# Patient Record
Sex: FEMALE
[De-identification: names, ages, dates, MRNs, and addresses within clinical notes are randomized per-mention and may not be internally consistent; named-entity substitution may affect disease eponyms.]

---

## 2023-12-28 ENCOUNTER — APPOINTMENT (OUTPATIENT)
Dept: ORTHOPEDIC SURGERY | Facility: CLINIC | Age: 73
End: 2023-12-28
Payer: MEDICARE

## 2023-12-28 VITALS — WEIGHT: 150 LBS | HEIGHT: 64 IN | BODY MASS INDEX: 25.61 KG/M2 | RESPIRATION RATE: 16 BRPM

## 2023-12-28 DIAGNOSIS — Z78.9 OTHER SPECIFIED HEALTH STATUS: ICD-10-CM

## 2023-12-28 DIAGNOSIS — Z87.39 PERSONAL HISTORY OF OTHER DISEASES OF THE MUSCULOSKELETAL SYSTEM AND CONNECTIVE TISSUE: ICD-10-CM

## 2023-12-28 DIAGNOSIS — S69.90XA UNSPECIFIED INJURY OF UNSPECIFIED WRIST, HAND AND FINGER(S), INITIAL ENCOUNTER: ICD-10-CM

## 2023-12-28 PROBLEM — Z00.00 ENCOUNTER FOR PREVENTIVE HEALTH EXAMINATION: Status: ACTIVE | Noted: 2023-12-28

## 2023-12-28 PROCEDURE — 99203 OFFICE O/P NEW LOW 30 MIN: CPT

## 2023-12-28 PROCEDURE — 73140 X-RAY EXAM OF FINGER(S): CPT | Mod: F9

## 2023-12-28 NOTE — HISTORY OF PRESENT ILLNESS
[Right] : right hand dominant [FreeTextEntry1] : Date of Injury: 12/26/23 (2 days ago) 73-year-old female presents for evaluation of right small finger injury after sustaining a fall 2 days ago while walking. The patient states that she did not seek medical attention and self treated at home with ice and Tylenol. The patient saw Janey for her shoulder therapy who advised the patient to see me.  She is having difficulty straightening her right small finger.

## 2023-12-28 NOTE — ASSESSMENT
[FreeTextEntry1] : My impression is the patient has a right small finger bony mallet injury.  I explained the expectations of a residual extensor lag and dorsal callus tissue prominence over the DIP joint.  I explained full-time mallet splint wear for 6 to 8 weeks to try and correct the deformity as much as possible and regain DIP extension strength with reconstitution of the terminal extensor insertion.  I did note that the splint should be worn at all times and that taking it off and flexing the DIP joint necessitates starting all over again. I directed the patient to keep an eye on for any skin irritation or maceration.  All questions were answered and he will plan to follow-up with me in 6 weeks. A prescription to obtain a custom split was given today.

## 2023-12-28 NOTE — PHYSICAL EXAM
[de-identified] : Physical exam demonstrates the patient to be alert and oriented x 3 and capable of ambulation. The patient is well-developed and well-nourished in no apparent respiratory distress. The majority of the skin is intact bilaterally in the upper extremities without any bilateral elbow lymphadenopathy.  There is tenderness over the right small finger DIP joint along with some swelling and bruising.  There is a 15 degree extensor lag at the DIP joint.  Weakness with active extension of the DIP joint.  FDP is intact.  Nail unit is intact.  Full, symmetric PIP joint range of motion. There is a small superficial abrasion over the dorsal aspect of the left ring finger. Full passive left small finger range of motion.   Sensation is intact to light touch at the affected finger.  The digit is well-perfused. [de-identified] : PA, lateral and oblique X-Rays of the right small finger were obtained today to assess for bony fracture or dislocation. There is evidence of a right small finger distal phalanx bony mallet fracture.  The DIP joint is concentric and congruent.  There is evidence of diffuse arthritis changes.

## 2024-02-08 ENCOUNTER — APPOINTMENT (OUTPATIENT)
Dept: ORTHOPEDIC SURGERY | Facility: CLINIC | Age: 74
End: 2024-02-08
Payer: MEDICARE

## 2024-02-08 DIAGNOSIS — S69.91XA UNSPECIFIED INJURY OF RIGHT WRIST, HAND AND FINGER(S), INITIAL ENCOUNTER: ICD-10-CM

## 2024-02-08 PROCEDURE — 99213 OFFICE O/P EST LOW 20 MIN: CPT

## 2024-02-08 PROCEDURE — 73140 X-RAY EXAM OF FINGER(S): CPT | Mod: F9
